# Patient Record
Sex: MALE | Race: WHITE | NOT HISPANIC OR LATINO | URBAN - METROPOLITAN AREA
[De-identification: names, ages, dates, MRNs, and addresses within clinical notes are randomized per-mention and may not be internally consistent; named-entity substitution may affect disease eponyms.]

---
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
## 2020-03-13 ENCOUNTER — COMMUNICATION - HEALTHEAST (OUTPATIENT)
Dept: PEDIATRICS | Facility: CLINIC | Age: 20
End: 2020-03-13

## 2020-03-13 DIAGNOSIS — F33.1 MODERATE EPISODE OF RECURRENT MAJOR DEPRESSIVE DISORDER (H): ICD-10-CM

## 2020-07-11 ENCOUNTER — RECORDS - HEALTHEAST (OUTPATIENT)
Dept: ADMINISTRATIVE | Facility: OTHER | Age: 20
End: 2020-07-11

## 2020-11-16 ENCOUNTER — COMMUNICATION - HEALTHEAST (OUTPATIENT)
Dept: PEDIATRICS | Facility: CLINIC | Age: 20
End: 2020-11-16

## 2020-11-16 DIAGNOSIS — F33.1 MODERATE EPISODE OF RECURRENT MAJOR DEPRESSIVE DISORDER (H): ICD-10-CM

## 2020-11-17 ENCOUNTER — OFFICE VISIT - HEALTHEAST (OUTPATIENT)
Dept: FAMILY MEDICINE | Facility: CLINIC | Age: 20
End: 2020-11-17

## 2020-11-17 DIAGNOSIS — F33.1 MODERATE EPISODE OF RECURRENT MAJOR DEPRESSIVE DISORDER (H): ICD-10-CM

## 2020-11-17 DIAGNOSIS — F41.9 ANXIETY: ICD-10-CM

## 2020-11-17 ASSESSMENT — MIFFLIN-ST. JEOR: SCORE: 1784.79

## 2020-11-17 ASSESSMENT — ANXIETY QUESTIONNAIRES
1. FEELING NERVOUS, ANXIOUS, OR ON EDGE: MORE THAN HALF THE DAYS
2. NOT BEING ABLE TO STOP OR CONTROL WORRYING: MORE THAN HALF THE DAYS
3. WORRYING TOO MUCH ABOUT DIFFERENT THINGS: MORE THAN HALF THE DAYS
7. FEELING AFRAID AS IF SOMETHING AWFUL MIGHT HAPPEN: SEVERAL DAYS
5. BEING SO RESTLESS THAT IT IS HARD TO SIT STILL: SEVERAL DAYS
GAD7 TOTAL SCORE: 11
6. BECOMING EASILY ANNOYED OR IRRITABLE: MORE THAN HALF THE DAYS
4. TROUBLE RELAXING: SEVERAL DAYS

## 2020-11-17 ASSESSMENT — PATIENT HEALTH QUESTIONNAIRE - PHQ9: SUM OF ALL RESPONSES TO PHQ QUESTIONS 1-9: 14

## 2021-05-27 ASSESSMENT — PATIENT HEALTH QUESTIONNAIRE - PHQ9: SUM OF ALL RESPONSES TO PHQ QUESTIONS 1-9: 14

## 2021-05-28 ASSESSMENT — ANXIETY QUESTIONNAIRES: GAD7 TOTAL SCORE: 11

## 2021-05-28 NOTE — TELEPHONE ENCOUNTER
Last clinic visit for depression was 9/14/2018 with a follow-up recommended in 6 months.    I do not see any future appointments.    Please contact patient and advise that he needs to come in for a follow-up with Bharti Meraz CNP concerning his depression.

## 2021-05-28 NOTE — TELEPHONE ENCOUNTER
Called and left message with mom explained message from Dr. Lawton. Call us back at [phone] to make an appointment.

## 2021-05-28 NOTE — TELEPHONE ENCOUNTER
RN cannot approve Refill Request    RN can NOT refill this medication not protocol approved for ages 21 and younger      Mikey Jarvis, [facility] Connection Triage/Med Refill 2/12/2019

## 2021-05-29 NOTE — TELEPHONE ENCOUNTER
RN cannot approve Refill Request    RN can NOT refill this medication med is not covered by policy/route to provider. Last office visit: Visit date not found Last Physical: 6/17/2015 Last MTM visit: Visit date not found Last visit same specialty: 9/14/2018 Bharti Meraz CNP.  Next visit within 3 mo: Visit date not found  Next physical within 3 mo: Visit date not found      Mana Juan, [facility] Triage/Med Refill 6/9/2019    Requested Prescriptions   Pending Prescriptions Disp Refills     sertraline (ZOLOFT) 50 MG tablet [Pharmacy Med Name: SERTRALINE 50MG TABLETS] 30 tablet 0     Sig: TAKE 1 TABLET(50 MG) BY MOUTH DAILY       [facility] Refill Protocol  Failed - 6/6/2019 10:01 PM        Failed - Age 21 and younger route to prescribing provider     Last office visit with prescriber/PCP: Visit date not found OR same dept: 9/14/2018 Bharti Meraz CNP OR same specialty: 9/14/2018 Bharti Meraz CNP  Last physical: 6/17/2015 Last MTM visit: Visit date not found   Next visit within 3 mo: Visit date not found  Next physical within 3 mo: Visit date not found  Prescriber OR PCP: Presley Lawton MD  Last diagnosis associated with med order: 1. Moderate episode of recurrent major depressive disorder (H)  - sertraline (ZOLOFT) 50 MG tablet [Pharmacy Med Name: SERTRALINE 50MG TABLETS]; TAKE 1 TABLET(50 MG) BY MOUTH DAILY  Dispense: 30 tablet; Refill: 0    If protocol passes may refill for 12 months if within 3 months of last provider visit (or a total of 15 months).             Passed - PCP or prescribing provider visit in last year     Last office visit with prescriber/PCP: Visit date not found OR same dept: 9/14/2018 Bharti Meraz CNP OR same specialty: 9/14/2018 Bharti Meraz CNP  Last physical: 6/17/2015 Last MTM visit: Visit date not found   Next visit within 3 mo: Visit date not found  Next physical within 3 mo: Visit date not found  Prescriber OR PCP: Presley Lawton MD  Last  diagnosis associated with med order: 1. Moderate episode of recurrent major depressive disorder (H)  - sertraline (ZOLOFT) 50 MG tablet [Pharmacy Med Name: SERTRALINE 50MG TABLETS]; TAKE 1 TABLET(50 MG) BY MOUTH DAILY  Dispense: 30 tablet; Refill: 0    If protocol passes may refill for 12 months if within 3 months of last provider visit (or a total of 15 months).

## 2021-05-29 NOTE — PROGRESS NOTES
Developmental Behavioral Pediatrics Follow Up    Jw is a 18 y.o. male who presents to the clinic today with his he is by himself today  to discuss  Depression.    His last visit was on Sept 2018 .  There were medication changes made at that visit.  Zoloft was increased to 50 mg at that time . He has been taking Zoloft since August 2018  For low mood , anger outbursts that had become unmanageable.        Phone Calls:  There have been no concerns     Presenting concerns:  Leaving for college soon  [employer] .  Looking forward to college.  Today , feels like he manages conflict much better and overall elevated mood     Interval history:  No concerns.  Sees a therapist once a month and feels this is very helpful.      School:  Graduated high school     Sleeping well , good appetite, denies any concern with anger management , which had been a problem in the past.           FAMILY SYSTEM AND SOCIAL HISTORY  Jw lives with Parents.      Current Outpatient Medications   Medication Sig     clindamycin (CLEOCIN T) 1 % lotion      EPIDUO FORTE 0.3-2.5 % GlwP APPLY TOPICALLY QD IN A THIN LAYER AA OF FACE AFTER WASHING QHS     sertraline (ZOLOFT) 50 MG tablet Take 1 tablet (50 mg total) by mouth daily.     sulfacetamide sodium-sulfur 10-5 % (w/w) Clsr Apply 1 application topically daily.     sertraline (ZOLOFT) 25 MG tablet Take 2 tablets (50 mg total) by mouth daily.     tretinoin (RETIN-A) 0.1 % cream Apply 1 application topically daily.       CURRENT HEALTH    NUTRITION:  regular diet  SLEEP HABITS: Sleeps well.  No problems falling or staying asleep.      REVIEW OF SYSTEMS  Cardiovascular:  no chest pain or dyspnea on exertion  Respiratory:  no cough, shortness of breath, or wheezing  GI:  no abdominal pain, change in bowel habits, or black or bloody stools  :  negative  Musculoskeletal:  negative  Integumentary:  negative  Endocrine:  negative  Neurological:  negative    OBJECTIVE INFORMATION  Awake and alert.   "Engaged in conversation at times.  Well groomed.  GEN: alert, well appearing  CVS: RRR, no murmur  LUNGS: clear, no increased work of breathing          IMPRESSION  Doing well with current medication regimen. No medication adjustment at this time. Continue to monitor progress.    PHQ 9 score 4 at last visit , today     CRAFFT has been concerning in the past , today  No concerns.  Jw tells me that he drinks socially , uses marijuana \" now and then.\"  He identifies that he has used less chemicals since on Zoloft     Return to clinic in 3 month(s).   Would like to see Jw before he leaves for college for a wellness visit.     BP Readings from Last 3 Encounters:   06/21/19 110/80   09/14/18 110/76 (18 %, Z = -0.90 /  73 %, Z = 0.60)*   08/01/18 110/80 (19 %, Z = -0.88 /  84 %, Z = 1.01)*     *BP percentiles are based on the August 2017 AAP Clinical Practice Guideline for boys         A total of 30 minutes was spent in face to face contact with the patient and family.  Greater than 50% of the time was spent in education, counseling, coordination of care and medical decision making related to the above issues.    Electronically signed by Bharti Meraz, CNP [unfilled] 2:32 PM  "

## 2021-05-29 NOTE — TELEPHONE ENCOUNTER
Call placed to patient parents. Left detailed message to call us back to schedule an appointment. This prescription will not be refilled until Jw make an appointment. He is overdue for a follow up on depression

## 2021-05-29 NOTE — TELEPHONE ENCOUNTER
Jw needs a follow up appointment before I can prescribe any more medication. Once it is scheduled , I will refill the medication.

## 2021-05-30 ENCOUNTER — RECORDS - HEALTHEAST (OUTPATIENT)
Dept: ADMINISTRATIVE | Facility: CLINIC | Age: 21
End: 2021-05-30

## 2021-05-31 VITALS — BODY MASS INDEX: 24.41 KG/M2 | WEIGHT: 170.1 LBS

## 2021-05-31 NOTE — PROGRESS NOTES
[facility] Well Child Check    ASSESSMENT & PLAN  Jw Marsh is a 18 y.o. who has normal growth and normal development.    Diagnoses and all orders for this visit:    Encounter for annual health examination  -     Hearing Screening  -     Vision Screening  -     PHQ9 Depression Screen    Major depressive disorder with single episode, in full remission (H)    Other orders  -     Meningococcal B (PF)        Continue on the sertraline    Follow up concerning depression during winter break    You need a second Bexsero after 4 weeks.   I am sure this can be obtained at your school.    Return in 1 year (on 8/19/2020) for Well Child Check.     IMMUNIZATIONS/LABS  Immunizations were reviewed and orders were placed as appropriate.    REFERRALS  Dental:  Recommend routine dental care as appropriate.  Other:  No additional referrals were made at this time.    ANTICIPATORY GUIDANCE  I have reviewed age appropriate anticipatory guidance.    HEALTH HISTORY  Do you have any concerns that you'd like to discuss today?: No concerns      Leaves for school 8-31-19     Going to   [employer].    Sertraline working for him  No problems with side effects    Seeing dermatologist for acne    PHQ-9 score is 3    Roomed by: rachna        Do you have any significant health concerns in your family history?: No  Family History   Problem Relation Age of Onset     Hyperlipidemia Mother      Alcohol abuse Father      Depression Father      Diabetes type I Brother      No Medical Problems Maternal Grandmother      No Medical Problems Maternal Grandfather      Prostate cancer Paternal Grandfather      Melanoma Paternal Grandfather      Since your last visit, have there been any major changes in your family, such as a move, job change, separation, divorce, or death in the family?: No  Has a lack of transportation kept you from medical appointments?: No    Home  Who lives in your home?:  Mom, dad, sister  Social History     Social History  Narrative     Not on file     Do you have any concerns about losing your housing?: No  Is your housing safe and comfortable?: yes  Do you have any trouble with sleep?:  No    Education  What school do you child attend?:  [employer]  What grade are you in?:  Freshman  How do you perform in school (grades, behavior, attention, homework?: good     Eating  Do you eat regular meals including fruits and vegetables?:  yes  What are you drinking (cow's milk, water, soda, juice, sports drinks, energy drinks, etc)?: water and juice  Have you been worried that you don't have enough food?: No  Do you have concerns about your body or appearance?:  No    Activities  Do you have friends?:  yes  Do you get at least one hour of physical activity per day?:  yes  How many hours a day are you in front of a screen other than for schoolwork (computer, TV, phone)?:  2  What do you do for exercise?:  workout  Do you have interest/participate in community activities/volunteers/school sports?:  no    MENTAL HEALTH SCREENING  PHQ-2 Total Score: 0 (8/19/2019 11:00 AM)  Depression Follow-up Plan: patient follow-up to return when and if necessary (6/21/2019  2:48 PM)    PHQ-9 Total Score: 2 (9/14/2018  8:00 AM)      VISION/HEARING  Vision: Completed. See Results  Hearing:  Completed. See Results     Hearing Screening    125Hz 250Hz 500Hz 1000Hz 2000Hz 3000Hz 4000Hz 6000Hz 8000Hz   Right ear:   25 20 20 20 20 20 20   Left ear:   25 20 20 20 20 20 20      Visual Acuity Screening    Right eye Left eye Both eyes   Without correction: 20/20 20/25 20/20   With correction:          TB Risk Assessment:  The patient and/or parent/guardian answer positive to:  patient and/or parent/guardian answer 'no' to all screening TB questions    Dyslipidemia Risk Screening  Have either of your parents or any of your grandparents had a stroke or heart attack before age 55?: No  Any parents with high cholesterol or currently taking medications to treat?: Yes: mom    "  Dental  When was the last time you saw the dentist?: Less than 30 days ago.  Approx date (required): 8/2/19   Last fluoride varnish application was within the past 30 days. Fluoride not applied today.      Patient Active Problem List   Diagnosis     Depression     Major depressive disorder with single episode, in full remission (H)         MEASUREMENTS  Height:  5' 10.67\" (1.795 m)  Weight: 169 lb 6.4 oz (76.8 kg)  BMI: Body mass index is 23.85 kg/m .  Blood Pressure: 100/68  Blood pressure percentiles are not available for patients who are 18 years or older.      11 to 18 Year [facility] Well Child Check          Physical Exam:    Gen: Awake, Alert and Cooperative  Head: Normocephalic  Eyes: PERRLA and EOM, RR++, symmetric light reflex  ENT: Normal pearly TMs bilaterally and oropharynx clear  Neck: supple  Lungs: Clear to auscultation bilaterally  CV: Normal S1 & S2 with regular rate and rhythm, no murmur present; femoral pulses 2+ bilaterally  Abd: Soft, nontender, non distended, no masses or hepatosplenomegaly  Anus: Normal  Spine:    Spine straight without curvature noted  : Normal male genitalia, testes descended   Phan:5  MSK: Moving all extremities and normal tone      Neuro:    DTRs 2+/4+  Skin: No rashes or lesions           REFERRALS  Dental:  Recommend routine dental care as appropriate.  Other:  No additional referrals were made at this time.    ANTICIPATORY GUIDANCE      Nutrition: Balanced diet, skim milk     Health: Drugs, Smoking, Alcohol and Dental Care  Safety: Seat Belts and Bike/Motorcycle Helmets  Sexuality: Safe Sex, STD's and Contraception        Presley Lawton MD  .      "

## 2021-06-01 VITALS — BODY MASS INDEX: 22.27 KG/M2 | WEIGHT: 159.7 LBS

## 2021-06-01 VITALS — WEIGHT: 160.9 LBS | BODY MASS INDEX: 22.52 KG/M2 | HEIGHT: 71 IN

## 2021-06-02 VITALS — WEIGHT: 158.8 LBS | BODY MASS INDEX: 22.15 KG/M2

## 2021-06-03 VITALS — BODY MASS INDEX: 23.72 KG/M2 | WEIGHT: 169.4 LBS | HEIGHT: 71 IN

## 2021-06-03 VITALS — BODY MASS INDEX: 24.16 KG/M2 | HEIGHT: 71 IN | WEIGHT: 172.6 LBS

## 2021-06-05 VITALS
HEART RATE: 84 BPM | HEIGHT: 71 IN | BODY MASS INDEX: 23.3 KG/M2 | DIASTOLIC BLOOD PRESSURE: 80 MMHG | RESPIRATION RATE: 20 BRPM | SYSTOLIC BLOOD PRESSURE: 112 MMHG | WEIGHT: 166.4 LBS

## 2021-06-06 NOTE — TELEPHONE ENCOUNTER
Last OV 08/19/2019    Continue on the sertraline     Follow up concerning depression during winter break     You need a second Bexsero after 4 weeks.              I am sure this can be obtained at your school.     Return in 1 year (on 8/19/2020) for Well Child Check.     Mailed letter to [facility] with New PCP

## 2021-06-06 NOTE — TELEPHONE ENCOUNTER
RN cannot approve Refill Request    RN can NOT refill this medication No established PCP. Last office visit: 6/21/2019 Bharti Meraz CNP Last Physical: 7/18/2018 Last MTM visit: Visit date not found Last visit same specialty: 6/21/2019 Bharti Meraz CNP.  Next visit within 3 mo: Visit date not found  Next physical within 3 mo: Visit date not found      Mana Juan, [facility] Triage/Med Refill 3/15/2020    Requested Prescriptions   Pending Prescriptions Disp Refills     sertraline (ZOLOFT) 50 MG tablet [Pharmacy Med Name: SERTRALINE 50MG TABLETS] 90 tablet 0     Sig: TAKE 1 TABLET(50 MG) BY MOUTH DAILY       SSRI Refill Protocol  Failed - 3/13/2020  8:46 PM        Failed - Age 21 and younger route to prescribing provider     Last office visit with prescriber/PCP: 6/21/2019 Bharti Meraz CNP OR same dept: 6/21/2019 Bharti Meraz CNP OR same specialty: 6/21/2019 Bharti eMraz CNP  Last physical: 7/18/2018 Last MTM visit: Visit date not found   Next visit within 3 mo: Visit date not found  Next physical within 3 mo: Visit date not found  Prescriber OR PCP: Bharti Meraz CNP  Last diagnosis associated with med order: 1. Moderate episode of recurrent major depressive disorder (H)  - sertraline (ZOLOFT) 50 MG tablet [Pharmacy Med Name: SERTRALINE 50MG TABLETS]; TAKE 1 TABLET(50 MG) BY MOUTH DAILY  Dispense: 90 tablet; Refill: 0    If protocol passes may refill for 12 months if within 3 months of last provider visit (or a total of 15 months).             Passed - PCP or prescribing provider visit in last year     Last office visit with prescriber/PCP: 6/21/2019 Bharti Meraz CNP OR same dept: 6/21/2019 Bharti Meraz CNP OR same specialty: 6/21/2019 Bharti Meraz CNP  Last physical: 7/18/2018 Last MTM visit: Visit date not found   Next visit within 3 mo: Visit date not found  Next physical within 3 mo: Visit date not found  Prescriber OR PCP:  Bharti Meraz, CNP  Last diagnosis associated with med order: 1. Moderate episode of recurrent major depressive disorder (H)  - sertraline (ZOLOFT) 50 MG tablet [Pharmacy Med Name: SERTRALINE 50MG TABLETS]; TAKE 1 TABLET(50 MG) BY MOUTH DAILY  Dispense: 90 tablet; Refill: 0    If protocol passes may refill for 12 months if within 3 months of last provider visit (or a total of 15 months).

## 2021-06-13 NOTE — PROGRESS NOTES
Subjective:      Jw Marsh is a 16 y.o. male who presents today along with his mother to rule-out strep.  Symptoms started yesterday with fever, chills, throat pain, body aches. Denies ear pain or drainage.   Denies nausea/vomiting, low appetite, cough, or wheezing  Fever of 102 degrees last night and improved today with Tylenol.   Taking tylenol as needed (last dose three hours ago) and lots of water.  Difficulty sleeping last night, fatigued today.  Attends highschool, did not go to school today.   No other illness present at home.    No medical conditions.    No Known Allergies  No past medical history on file.  Current Outpatient Prescriptions   Medication Sig Dispense Refill     doxycycline (VIBRAMYCIN) 100 MG capsule Take 1 capsule by mouth 2 (two) times a day.  0     sulfacetamide sodium-sulfur 10-5 % (w/w) Clsr Apply 1 application topically daily.  1     tretinoin (RETIN-A) 0.1 % cream Apply 1 application topically daily.  1     No current facility-administered medications for this visit.        Review of Systems   Constitutional: Fatigue, fever, chills.   HENT: Sore throat.   Eyes: Negative.   Respiratory: Negative.   Cardiovascular: Negative.   Gastrointestinal: Negative.   Endocrine: Negative.   Genitourinary: Negative.   Musculoskeletal: Body aches.   Skin: Negative.   Allergic/Immunologic: Negative.   Neurological: Negative.   Hematological: Negative.   Psychiatric/Behavioral: Negative.     Objective:    Physical Exam   /62  Pulse 98  Temp 98.9  F (37.2  C) (Oral)   Resp 20  Wt 170 lb 1.6 oz (77.2 kg)  SpO2 97%    Constitutional: Alert and oriented x3, well nourished without any acute distress  HENT:   Nose: Nose normal. Rhinorrhea.   Mouth/Throat: Oropharynx is clear and moist. Tonsils enlarged,stage two, with spotty white patches present.   Eyes: Conjunctivae and EOM are normal. Pupils are equal, round, and reactive to light. Right eye exhibits no discharge. Left eye exhibits no  discharge.   Neck: No thyromegaly present.   Lymphadenopathy: Shotty cervical lymphadenopathy.   Cardiovascular: Normal S1 and S2. Regular rate, rhythm and no murmur, rubs or gallops.   Pulmonary/Chest: Normal effort. Lungs clear to auscultation in all lobes. Without wheezing, rhonchi or crackles.    Skin: Diaphoretic; without rashes or lesions.        Assessment/Plan:    1. Throat pain  2. Fever  Recent Results (from the past 24 hour(s))   Rapid Strep A Screen-Throat   Result Value Ref Range    Rapid Strep A Antigen No Group A Strep detected, presumptive negative No Group A Strep detected, presumptive negative   Influenza A/B Rapid Test   Result Value Ref Range    Influenza  A, Rapid Antigen No Influenza A antigen detected No Influenza A antigen detected    Influenza B, Rapid Antigen No Influenza B antigen detected No Influenza B antigen detected   Mononucleosis Screen   Result Value Ref Range    Mono Screen Negative Negative     Rapid strep negative, cultures pending. Negative influenza A/B and mono today.    Discussed home supportive care and recommended rest, fluids/ warm fluids, humidification, saline nasal spray, throat lozenges, gargle with warm salt water.    Discussed red flags that would warrant urgent evaluation. Patient verbalized understanding.  Discussed with patient to follow up if worsening symptoms, questions or concerns.  Patient and mother agreed and appeared pleased with plan.    - Rapid Strep A Screen-Throat  - Group A Strep, RNA Direct Detection, Throat  - Influenza A/B Rapid Test  - Mononucleosis Screen    Office visit was completed with Student NP, Elida Oliva today. I did complete the physical exam and all charting with Elida.     Rosa Dos Santos, MISAEL  10/11/2017

## 2021-06-13 NOTE — TELEPHONE ENCOUNTER
This appointment needs to be 30 min in length and should be also an established care with an adult provider.  I am not licensed to see this patient at this age.

## 2021-06-13 NOTE — PROGRESS NOTES
Assessment / Impression     1. Moderate episode of recurrent major depressive disorder (H)  sertraline (ZOLOFT) 50 MG tablet   2. Anxiety         The following are part of a depression follow up plan for the patient:  mental health care assessment    Plan:     We discussed treatment options for his worsening depression and anxiety symptoms and decided to restart sertraline 50 mg daily.  I encouraged him to start getting regular exercise, get adequate sleep and eat healthy foods.  I also recommended he start counseling.  He is going to call his school to see what services are available to him for this.  I asked him to schedule follow-up appointment with me in 1 month or sooner if needed.  This can be done virtually.    Subjective:      HPI: Jw Marsh is a 20 y.o. male who has a history of depression and anxiety presents to the clinic to discuss worsening symptoms over the past 3 months.  He describes feeling more down, depressed, less motivated.  He reports sleeping more and having lower energy levels less as well.  He also describes eating a fairly unhealthy diet.  He is not currently exercising.  He reports that he has been staying in his room more this past semester.  Part of this is due to the coronavirus pandemic.  He is in his second year at the [employer].  He reports that his grades have been worse this semester.  They recently moved everything online soda for the remainder of this semester.        Review of Systems  All other systems reviewed and are negative.     Social History     Tobacco Use   Smoking Status Never Smoker   Smokeless Tobacco Never Used       Family History   Problem Relation Age of Onset     Hyperlipidemia Mother      Alcohol abuse Father      Depression Father      Diabetes type I Brother      No Medical Problems Maternal Grandmother      No Medical Problems Maternal Grandfather      Prostate cancer Paternal Grandfather      Melanoma Paternal Grandfather   "      Objective:     /80 (Patient Site: Right Arm, Patient Position: Sitting, Cuff Size: Adult Regular)   Pulse 84   Resp 20   Ht 5' 10.87\" (1.8 m)   Wt 166 lb 6.4 oz (75.5 kg)   BMI 23.30 kg/m    Physical Examination: General appearance - alert, well appearing, and in no distress  Eyes: pupils equal and reactive, extraocular eye movements intact  Mouth: mucous membranes moist, pharynx normal without lesions  Neck: supple, no significant adenopathy  Lungs: clear to auscultation, no wheezes, rales or rhonchi, symmetric air entry  Heart: normal rate, regular rhythm, normal S1, S2, no murmurs, rubs, clicks or gallops  Neurological: alert, oriented, normal speech, no focal findings or movement disorder noted.    Extremities: No edema, no clubbing or cyanosis  Psychiatric: Appears moderately depressed and anxious.  He answers questions appropriately.  PHQ-9 score is 14.  He denies suicidal ideations.  MARIBEL-7 score is 11.    No results found for this or any previous visit (from the past 168 hour(s)).    Current Outpatient Medications   Medication Sig Note     EPIDUO FORTE 0.3-2.5 % GlwP APPLY TOPICALLY QD IN A THIN LAYER AA OF FACE AFTER WASHING QHS      sertraline (ZOLOFT) 50 MG tablet Take 1 tablet (50 mg total) by mouth daily.      sulfacetamide sodium-sulfur 10-5 % (w/w) Clsr Apply 1 application topically daily. 11/10/2016: Received from: External Pharmacy Received Sig:      tretinoin (RETIN-A) 0.1 % cream Apply 1 application topically daily. 11/10/2016: Received from: External Pharmacy Received Sig: APPLY TO THE FACE EVERY EVENING     clindamycin (CLEOCIN T) 1 % lotion  8/1/2018: Received from: External Pharmacy     "

## 2021-06-16 PROBLEM — F41.9 ANXIETY: Status: ACTIVE | Noted: 2020-11-17

## 2021-06-16 PROBLEM — F32.A DEPRESSION: Status: ACTIVE | Noted: 2018-08-01

## 2021-06-16 PROBLEM — F32.5 MAJOR DEPRESSIVE DISORDER WITH SINGLE EPISODE, IN FULL REMISSION (H): Status: ACTIVE | Noted: 2019-08-23

## 2021-06-16 NOTE — TELEPHONE ENCOUNTER
Telephone Encounter by Presley Lawton MD at 6/9/2019  1:55 PM     Author: Presley Lawton MD Service: -- Author Type: Physician    Filed: 6/9/2019  1:57 PM Encounter Date: 6/6/2019 Status: Signed    : Presley Lawton MD (Physician)           Phone encounter from 4/28/2019 as below.    I still see no future appointment scheduled.    I am sending this task on to Bharti Meraz CNP since she did the original prescription and management.    [place] Lawton MD       Conversation: Medication Refill   (Newest Message First)   April 30, 2019     Elida Kenney LPN           8:46 AM   Note      Called and left message with mom explained message from Dr. Lawton. Call us back at [phone] to make an appointment.                      8:45 AM      Elida Kenney LPN attempted to contact Rhiannon Marsh (Mother) (Left Message)      April 29, 2019     Me   to Presley Lawton Care Team Pool         3:50 PM   Note             Last clinic visit for depression was 9/14/2018 with a follow-up recommended in 6 months.     I do not see any future appointments.     Please contact patient and advise that he needs to come in for a follow-up with Bharti Meraz CNP concerning his depression.

## 2021-06-17 NOTE — PATIENT INSTRUCTIONS - HE
Patient Instructions by Bharti Meraz CNP at 6/21/2019  2:30 PM     Author: Bharti Meraz CNP Service: -- Author Type: Nurse Practitioner    Filed: 6/21/2019  2:45 PM Encounter Date: 6/21/2019 Status: Signed    : Bharti Meraz CNP (Nurse Practitioner)       Patient Education     Depression: Tips to Help Yourself    As your healthcare providers help treat your depression, you can also help yourself. Keep in mind that your illness affects you emotionally, physically, mentally, and socially. So full recovery will take time. Take care of your body and your soul, and be patient with yourself as you get better.  Self-care    Educate yourself. Read about treatment and medicine options. If you have the energy, attend local conferences or support groups. Keep a list of useful websites and helpful books and use them as needed. This illness is not your fault. Dont blame yourself for your depression.    Manage early symptoms. If you notice symptoms returning, experience triggers, or identify other factors that may lead to a depressive episode, get help as soon as possible. Ask trusted friends and family to monitor your behavior and let you know if they see anything of concern.    Work with your provider. Find a provider you can trust. Communicate honestly with that person and share information on your treatment for depression and your reaction to medicines.    Be prepared for a crisis. Know what to do if you experience a crisis. Keep the phone number of a crisis hotline and know the location of your community's urgent care centers and the closest emergency department.    Hold off on big decisions. Depression can cloud your judgment. So wait until you feel better before making major life decisions, such as changing jobs, moving, or getting  or .    Be patient. Recovering from depression is a process. Dont be discouraged if it takes some time to feel better.    Keep it simple.  Depression saps your energy and concentration. So you wont be able to do all the things you used to do. Set small goals and do what you can.    Be with others. Dont isolate yourself--youll only feel worse. Try to be with other people. And take part in fun activities when you can. Go to a movie, ballgame, Mandaeism service, or social event. Talk openly with people you can trust. And accept help when its offered.  Take care of your body  People with depression often lose the desire to take care of themselves. That only makes their problems worse. During treatment and afterward, make a point to:    Exercise. Its a great way to take care of your body. And studies have shown that exercise helps fight depression.    Avoid drugs and alcohol. These may ease the pain in the short term. But theyll only make your problems worse in the long run.    Get relief from stress. Ask your healthcare provider for relaxation exercises and techniques to help relieve stress.    Eat right. A balanced and healthy diet helps keep your body healthy.  Date Last Reviewed: 1/1/2017 2000-2017 [employer]. [address] [place] [address], [place] [address]. All rights reserved. This information is not intended as a substitute for professional medical care. Always follow your healthcare professional's instructions.

## 2021-06-17 NOTE — PATIENT INSTRUCTIONS - HE
Patient Instructions by Presley Lawton MD at 8/19/2019 11:00 AM     Author: Presley Lawton MD Service: -- Author Type: Physician    Filed: 8/19/2019 11:42 AM Encounter Date: 8/19/2019 Status: Addendum    : Presley Lawton MD (Physician)    Related Notes: Original Note by Presley Lawton MD (Physician) filed at 8/19/2019 11:29 AM           Continue on the sertraline    Follow up concerning depression during winter break    You need a second Bexsero after 4 weeks.   I am sure this can be obtained at your school.    Return in 1 year (on 8/19/2020) for Well Child Check.     Patient Education             [facility] Patient Handout   18 to 21 Year Visits     Your Daily Life    Visit the dentist at least twice a year.    Protect your hearing at work, home, and concerts.    Eat a variety of healthy foods.    Eat breakfast every morning.    Drink plenty of water.    Make sure to get enough calcium.    Have 3 or more servings of low-fat (1%) or fat-free milk and other low-fat dairy products each day.    Aim for 1 hour of vigorous physical activity.    Be proud of yourself when you do something well.  Healthy Behavior Choices    Support friends who choose not to use drugs, alcohol, tobacco, steroids, or diet pills.    If you use drugs or alcohol, you can talk to us about it. We can help you with quitting or cutting down on your use.    Make healthy decisions about your sexual behavior.    If you are sexually active, always practice safe sex. Always use a condom to prevent STIs.    All sexual activity should be something you want. No one should ever force or try to convince you.    Find safe activities at school and in the community. Violence and Injuries    Do not drink and drive or ride in a vehicle with someone who has been using drugs or alcohol.    If you feel unsafe driving or riding with someone, call someone you trust to drive you.    Always wear a seat belt in the car.    Know the rules for safe  driving.    Never allow physical harm of yourself or others at home or school.    Always deal with conflict using nonviolence.    Remember that healthy dating relationships are built on respect and that saying no is OK.    Fighting and carrying weapons can be dangerous.  Your Feelings    Figure out healthy ways to deal with stress.    Try your best to solve problems and make decisions on your own.    Most people have daily ups and downs. But if you are feeling sad, depressed, nervous, irritable, hopeless, or angry, talk with me or another health professional.    We understand sexuality is an important part of your development. If you have any questions or concerns, we are here for you. School and Friends    Take responsibility for being organized enough to succeed in work or school.    Find new activities you enjoy.    Consider volunteering and helping others in the community on an issue that interests or concerns you.    Form healthy friendships and find fun, safe things to do with friends.    As you get older, making and keeping friends is important. You may find that you drift away from some of your old friends--thats normal.    Evaluate your friendships and keep those that are healthy.    It is still important to stay connected with your family.              unknown

## 2021-06-19 NOTE — PROGRESS NOTES
[facility] Well Child Check    ASSESSMENT & PLAN  Jw Marsh is a 17  y.o. 8  m.o. who has normal growth and normal development.    Diagnoses and all orders for this visit:    WCC (well child check),  under 8 days old  -     PHQ9 Depression Screen  -     Lipid Profile    Other orders  -     Meningococcal MCV4P       Sports physical today for hockey .   No concerns.  Questions about mom history high cholesterol, will check lipid panel today.     Return to clinic in 1 year for a Well Child Check or sooner as needed    IMMUNIZATIONS/LABS  Immunizations were reviewed and orders were placed as appropriate.    REFERRALS  Dental:  Recommend routine dental care as appropriate.  Other:  No additional referrals were made at this time.    ANTICIPATORY GUIDANCE  Social:  Friends, Peer Pressure, Employment and Need for Privacy  Parenting:  [place]/Dependence, Allowance, Homework, Family Time and Confidential Health Care  Nutrition:  Junk Food, Dieting and Body Image  Play and Communication:  Organized Sports, Appropriate Use of TV, Hobbies, Creative Talents, Read Books and Media Violence Awareness  Health:  Acne, Self-image building, Drugs, Smoking, Alcohol, Self Testicular Exam, Sleep and Sun Screen  Safety:  Recreational vehicles, Bike/Motorcycle Helmets, Safe storage of Weapons and Outdoor Safety Avoiding Sun Exposure  Sexuality:  Body Changes, Preparation for Menses, Wet Dreams, Safe Sex, STD's and Contraception    HEALTH HISTORY  Do you have any concerns that you'd like to discuss today?: No concerns       Accompanied by Mother        Do you have any significant health concerns in your family history?: No  Family History   Problem Relation Age of Onset     Hyperlipidemia Mother      No Medical Problems Father      Diabetes type I Brother      No Medical Problems Maternal Grandmother      No Medical Problems Maternal Grandfather      Prostate cancer Paternal Grandfather      Melanoma Paternal Grandfather       Since your last visit, have there been any major changes in your family, such as a move, job change, separation, divorce, or death in the family?: No  Has a lack of transportation kept you from medical appointments?: No    Home  Who lives in your home?:  Mother, Father, Brother and sister  Social History     Social History Narrative     Do you have any concerns about losing your housing?: No  Is your housing safe and comfortable?: Yes  Do you have any trouble with sleep?:  No    Education  What school do you child attend?:  [employer]  What grade are you in?:  12th  How do you perform in school (grades, behavior, attention, homework?: Good     Eating  Do you eat regular meals including fruits and vegetables?:  yes  What are you drinking (cow's milk, water, soda, juice, sports drinks, energy drinks, etc)?: cow's milk- 2%, water, soda, juice and sports drinks  Have you been worried that you don't have enough food?: No  Do you have concerns about your body or appearance?:  No    Activities  Do you have friends?:  yes  Do you get at least one hour of physical activity per day?:  yes  How many hours a day are you in front of a screen other than for schoolwork (computer, TV, phone)?:  2  What do you do for exercise?:  Gym, play hockey  Do you have interest/participate in community activities/volunteers/school sports?:  yes    MENTAL HEALTH SCREENING  PHQ-2 Total Score: 0 (7/18/2018  9:51 AM)  Depression Follow-up Plan: patient follow-up to return when and if necessary (7/18/2018  9:51 AM)  PHQ-9 Total Score: 3 (7/18/2018  9:51 AM)    VISION/HEARING  Vision: Completed. See Results  Hearing:  Completed. See Results     Hearing Screening    125Hz 250Hz 500Hz 1000Hz 2000Hz 3000Hz 4000Hz 6000Hz 8000Hz   Right ear:   30 20 20 20 20 20    Left ear:   40 25 20 20 20 20       Visual Acuity Screening    Right eye Left eye Both eyes   Without correction: 10/16 10/16 10/12.5   With correction:      Comments: Plus Lens:  "Pass      TB Risk Assessment:  The patient and/or parent/guardian answer positive to:  patient and/or parent/guardian answer 'no' to all screening TB questions    Dyslipidemia Risk Screening  Have either of your parents or any of your grandparents had a stroke or heart attack before age 55?: No  Any parents with high cholesterol or currently taking medications to treat?: Yes: Mom has high cholesterol but not on medication rashad     Dental  When was the last time you saw the dentist?: 3-6 months ago   Parent/Guardian declines the fluoride varnish application today. Fluoride not applied today.    Patient Active Problem List   Diagnosis     Anterior cruciate ligament complete tear 10-27-16       Drugs  Does the patient use tobacco/alcohol/drugs?:  no    Safety  Does the patient have any safety concerns (peer or home)?:  no  Does the patient use safety belts, helmets and other safety equipment?:  yes    Sex  Have you ever had sex?:  No    MEASUREMENTS  Height:  5' 11\" (1.803 m)  Weight: 160 lb 14.4 oz (73 kg)  BMI: Body mass index is 22.44 kg/(m^2).  Blood Pressure: 110/64  Blood pressure percentiles are 14 % systolic and 28 % diastolic based on NHBPEP's 4th Report. Blood pressure percentile targets: 90: 136/86, 95: 139/90, 99 + 5 mmH/103.    PHYSICAL EXAM  PHYSICAL EXAM  Growth parameters are noted and are appropriate for age.    General:   alert, appears stated age and cooperative   Skin:   normal   Head:   normal fontanelles   Eyes:   sclerae white, pupils equal and reactive, red reflex normal bilaterally   Ears:   normal bilaterally   Mouth:   No perioral or gingival cyanosis or lesions.  Tongue is normal in appearance.   Lungs:   clear to auscultation bilaterally   Heart:   regular rate and rhythm, S1, S2 normal, no murmur, click, rub or gallop   Abdomen:   soft, non-tender; bowel sounds normal; no masses,  no organomegaly       :   normal female, Phan 5   Musculoskeletal, negative \" duck walk\"  Scoliosis " screen negative       Extremities:   extremities normal, atraumatic, no cyanosis or edema   Neuro:   alert, moves all extremities spontaneously, gait normal, sits without support, no head lag

## 2021-06-19 NOTE — PROGRESS NOTES
"Developmental Behavioral Pediatrics    Jw is a 17 y.o. male who presents to the clinic today for a consultation requested by father and patient , related to concerns regarding Depression.  For the past year, feeling very down and withdrawn.  Getting a lot more angry recently than he ever used to with family and during hockey.  Feels like he is never up and mostly down.  Not sleeping well , often up several hours at night.  Positive FH depression in father.  Father medicated for several years and is now sober.      Dad with concerns about drug and alcohol use as a \" self medication.\"   No change in grades, no change in friend group.  Plays hockey and hopes to play college hockey.   Denies suicidal ideation , denies SA.        School:  11th grade         Currently receiving the following school services none.    FAMILY SYSTEM AND SOCIAL HISTORY  Jw lives with Parents.  He is not receiving any further family support or services at this time.    PRIVATE THERAPY/SERVICES  none    Current Outpatient Prescriptions   Medication Sig     amoxicillin (AMOXIL) 500 MG tablet      clindamycin (CLEOCIN T) 1 % lotion      sulfacetamide sodium-sulfur 10-5 % (w/w) Clsr Apply 1 application topically daily.     tretinoin (RETIN-A) 0.1 % cream Apply 1 application topically daily.       CURRENT HEALTH  Immunization History   Administered Date(s) Administered     DTaP, historic 01/09/2001, 03/09/2001, 05/04/2001, 02/11/2002     Dtap 09/01/2006     HPV Quadrivalent 09/12/2012, 07/02/2013, 12/06/2013     Hep A, historic 11/26/2008     Hep B, historic 01/09/2001, 03/09/2001, 02/11/2002     Hepatitis A, Ped/Adol 2 Dose IM (18yr & under) 09/01/2006     HiB, historic,unspecified 01/09/2001, 03/09/2001, 02/11/2002     IPV 01/09/2001, 03/09/2001, 05/04/2001, 09/01/2006     Influenza, inj, historic,unspecified 11/26/2008     Influenza, seasonal,quad inj 6-35 mos 09/12/2012     Influenza,seasonal quad, PF 12/06/2013     MMR 11/05/2001, " 2006     Meningococcal MCV4P 2012, 2018     Pneumo Conj 7-V(before 2010) 2001, 2001, 2001     Tdap 2012     Varicella 2001, 2011     Immunizations status:  up to date and documented  Review of patient's allergies indicates no known allergies.  NUTRITION:  eats well, variety of foods  ELIMINATION:  No concerns   SLEEP HABITS: Wakes frequently at night.  often watches TV from 2 AM until 4 Am       BIRTH AND DEVELOPMENTAL HISTORY  Family reports no exposure to substances during pregnancy.    Negative birth history of any concern , negative prematurity     Resuscitation/ complications: None reported    DEVELOPMENT   Motor milestones typical.  Speech milestones typical.  Regression:  No    No past medical history on file.    PAST DIAGNOSTIC TESTING      FAMILY MEDICAL AND MENTAL HEALTH HISTORY  There is not family history of sudden unexplained cardiac death.  There is a family history of Alcoholism: father   Anxiety:  father   Depression: father .  There is no reported family history of Autism  OCD  learning disabilities  ADHD  Bipolar/Mood Disorder  Suicide attempted  Suicide completed.    REVIEW OF SYSTEMS  Cardiovascular:  no chest pain or dyspnea on exertion  Respiratory:  no cough, shortness of breath, or wheezing  GI:  no abdominal pain, change in bowel habits, or black or bloody stools  :  negative  Musculoskeletal:  negative  Integumentary:  negative  Endocrine:  negative  Neurological:  negative    PHYSICAL EXAM  GEN: alert, well appearing  EYES: clear  R EAR: canal clear, TM pearly gray  L EAR: canal clear, TM pearly gray  NOSE: clear  OROPHARYNX: clear  NECK: supple, no significant LAD  CVS: RRR, no murmur  LUNGS: clear, no increased work of breathing  ABD: soft, non-tender, non-distended  EXT: warm, well perfused, no swelling  MSK: nl muscle bulk, spine straight  NEURO: CN grossly intact, nl strength in UE and LE, nl gait, no dysmetria  SKIN:  clear    OBJECTIVE INFORMATION  Jw denies suicidal ideation    CRAFT assessment concerning for high risk alcohol usage, reviewed   SCARED score 22, MARIBEL 7   PH 9 score today 9      ASSESSMENT  Jw is a 17 y.o. male with depression     PLAN  List of crisis hot lines given and reviewed  Reviewed Black Box Warning and symptoms to report   Reviewed coping skills and importance of therapy.  Father agrees to schedule appt with therapist.  List given and reviewed   Recheck in 2 weeks via phone, reviewed Zoloft Side effects and appropriate usage , reviewed symptoms to report   Recheck in clinic in one month.     Hope to communicate with therapist after first few visits. Discussed with father.     45  minutes spent with the patient and their family. >50% in counseling and coordination of care.

## 2021-06-20 NOTE — LETTER
Letter by Bharti Meraz CNP at      Author: Bharti Meraz CNP Service: -- Author Type: --    Filed:  Encounter Date: 3/13/2020 Status: (Other)       [facility]  [address] [facility] [facility] [address]      03/16/20      Dear Jw,      Thank you for using the [facility] for you health care needs. In reviewing your chart we have noticed that you need to establish care with an new provider due to Dr Lawton's alf.  We would like to inform you of the list of providers at [facility] that are currently taking new patients:      Dr. Sirena Lester - Internal Medicine   Carol Ann Abdul CNP - Internal Medicine    SERG Santos - Family Medicine   Dr. Ollie Reyes - Family Medicine   Dr. Elida Matthews - Family Medicine        Please call and schedule an Establish Care Long  visit with a new provider (prior to needing a refill) as your new provider will take over prescribing this medication.      The [facility] strives to see all patients in a timely manner and we need your help to achieve this.  The above mentioned providers do have openings in the near future for new patients and look forward to seeing you.    Please call [phone] and one of our skilled patient access representatives will accommodate your scheduling needs.        Sincerely,    Pily Loera CMA - LULU/CA  [facility]  [address] [address]  [phone]

## 2021-06-20 NOTE — PROGRESS NOTES
"Developmental Behavioral Pediatrics Follow Up    Jw is a 17 y.o. male who presents to the clinic today with his Dad to discuss  Depression.    Patient has been on Zoloft for 5 weeks at 25 mg .  Overall , patient feels much \" lighter, \"overall.  Admits to approaching problems with a better attitude and has been relying on a close friend to talk over issues.  In the past, Jw tells me he would not have reached out to friends in the past.  Jw had been using alcohol as a way to \" forget things.\"  He tells me today that he did not like the way alcohol made him feel the next day as it ramped up his feelings of anxiety and depression .   CRAFFT tool done today improved.  Jw tells me he keeps the crisis hot line numbers in his room , but today denies any suicidal thinking.  He denies any stomach ache , HA or sleeplessness.  After discussion with father, we decide to increase Zoloft to 50 mg today for 2 weeks to evaluate whether this will improve his symptoms and we will evaluate any side effects from this increased dose.   His last visit was on Aug 10 .  There were medication changes made at that visit.  Zoloft was initiated .   Patient seeing therapist weekly and feels this is very helpful.      PHQ 9 score one month ago 9 , today's score 4    Phone Calls:  Follow up phone call at 1 month, I did not hear back from family     Presenting concerns:  Doing well , no side effects     Interval history:  Negative     School:  12         Currently receiving the following school services none.    FAMILY SYSTEM AND SOCIAL HISTORY  Jw lives with Parents.    .lastb  Wt Readings from Last 3 Encounters:   09/14/18 158 lb 12.8 oz (72 kg) (67 %, Z= 0.44)*   08/01/18 159 lb 11.2 oz (72.4 kg) (69 %, Z= 0.49)*   07/18/18 160 lb 14.4 oz (73 kg) (71 %, Z= 0.54)*     * Growth percentiles are based on CDC 2-20 Years data.     BP Readings from Last 3 Encounters:   09/14/18 110/76   08/01/18 110/80   07/18/18 110/64         Current " Outpatient Prescriptions   Medication Sig     clindamycin (CLEOCIN T) 1 % lotion      sertraline (ZOLOFT) 25 MG tablet Take 1 tablet (25 mg total) by mouth daily.     sulfacetamide sodium-sulfur 10-5 % (w/w) Clsr Apply 1 application topically daily.     tretinoin (RETIN-A) 0.1 % cream Apply 1 application topically daily.       CURRENT HEALTH    NUTRITION:  regular diet  SLEEP HABITS: Sleeps well.  No problems falling or staying asleep.      REVIEW OF SYSTEMS  Cardiovascular:  no chest pain or dyspnea on exertion  Respiratory:  no cough, shortness of breath, or wheezing  GI:  no abdominal pain, change in bowel habits, or black or bloody stools  :  negative  Musculoskeletal:  negative  Integumentary:  negative  Endocrine:  negative  Neurological:  negative    OBJECTIVE INFORMATION  Awake and alert.  Engaged in conversation at times.  Well groomed.  GEN: alert, well appearing  CVS: RRR, no murmur  LUNGS: clear, no increased work of breathing          IMPRESSION  Some response from medication, but would benefit from medication adjustment.  Increase Zoloft to 50 mg today , recheck in 6 months     Return to clinic in 6 month(s).    A total of 30 minutes was spent in face to face contact with the patient and family.  The entire time was spent in education, counseling, coordination of care and medical decision making related to the above issues.    Electronically signed by Bharti Meraz, CNP [unfilled] 7:42 AM

## 2021-07-03 NOTE — ADDENDUM NOTE
Addendum Note by Bharti Chakraborty CNP at 8/1/2018 10:18 AM     Author: Bharti Chakraborty CNP Service: -- Author Type: --    Filed: 8/1/2018 10:18 AM Encounter Date: 8/1/2018 Status: Signed    : Bharti Chakraborty CNP (Nurse Practitioner)    Addended by: BHARTI CHAKRABORTY on: 8/1/2018 10:18 AM        Modules accepted: Orders